# Patient Record
(demographics unavailable — no encounter records)

---

## 2025-05-08 NOTE — CONSULT LETTER
[Today's Date] : [unfilled] [Name] : Name: [unfilled] [] : : ~~ [Today's Date:] : [unfilled] [Dear  ___:] : Dear Dr. [unfilled]: [Consult - Single Provider] : Thank you very much for allowing me to participate in the care of this patient. If you have any questions, please do not hesitate to contact me. [Sincerely,] : Sincerely, [FreeTextEntry9] : 5/7/25 [FreeTextEntry4] : Dr Ontiveros [FreeTextEntry5] : [phone] [FreeTextEntry6] : [place] DOAN [id number] [FreeTextEntry1] : 5/7/25 [de-identified] : Vitor Luna MD, FAAP, FACC, FAHA Chief Emeritus, Division of Pediatric Cardiology [facility] Professor, Department of Pediatrics, [facility]

## 2025-05-08 NOTE — CARDIOLOGY SUMMARY
[Today's Date] : [unfilled] [de-identified] : 5/7/25 [FreeTextEntry1] : Sinus rhythm rate 89 bpm, QRS axis was 81 degrees, SD 0.12, QRS 0.10, QTc 0.41 seconds and is within normal limits for age. [de-identified] : 5/7/24 [FreeTextEntry2] : Summary:  1. Mild mitral regurgitation without discernible underlying morphological mitral abnormality. 2. No other abnormalities seen. 3. Normal right ventricular morphology with qualitatively normal size and systolic function. 4. Normal left ventricular size, morphology and systolic function. 5. No evidence of pulmonary hypertension. 6. No pericardial effusion. [de-identified] : 5/7/25 ordered with MVO2 [de-identified] : 5/7/25 ordered [de-identified] : Fasting lipid profile, LPA, CMP, CRP, homocysteine level

## 2025-05-08 NOTE — DISCUSSION/SUMMARY
[May participate in all age-appropriate activities] : [unfilled] May participate in all age-appropriate activities. [Needs SBE Prophylaxis] : [unfilled] does not need bacterial endocarditis prophylaxis [FreeTextEntry1] : Await results of exercise stress test with MVO 2; and lipid profile; follow-up 2 months as needed

## 2025-05-08 NOTE — REASON FOR VISIT
[Initial Consultation] : an initial consultation for [Family Member] : family member [FreeTextEntry3] : chest tightness